# Patient Record
Sex: MALE | Race: WHITE | NOT HISPANIC OR LATINO | URBAN - NONMETROPOLITAN AREA
[De-identification: names, ages, dates, MRNs, and addresses within clinical notes are randomized per-mention and may not be internally consistent; named-entity substitution may affect disease eponyms.]

---

## 2024-05-02 ENCOUNTER — OFFICE (OUTPATIENT)
Dept: URBAN - NONMETROPOLITAN AREA CLINIC 1 | Facility: CLINIC | Age: 69
End: 2024-05-02
Payer: COMMERCIAL

## 2024-05-02 VITALS
WEIGHT: 168 LBS | HEIGHT: 69 IN | DIASTOLIC BLOOD PRESSURE: 71 MMHG | HEART RATE: 78 BPM | SYSTOLIC BLOOD PRESSURE: 128 MMHG

## 2024-05-02 DIAGNOSIS — R63.4 ABNORMAL WEIGHT LOSS: ICD-10-CM

## 2024-05-02 DIAGNOSIS — G20.A1 PARKINSON'S DISEASE WITHOUT DYSKINESIA, WITHOUT MENTION OF F: ICD-10-CM

## 2024-05-02 PROCEDURE — 99204 OFFICE O/P NEW MOD 45 MIN: CPT | Performed by: NURSE PRACTITIONER

## 2024-05-02 NOTE — SERVICENOTES
Risks of procedure explained to patient
Bowel prep will be prescribed and instructions will be given to patient if he elects to schedule colonoscopy.
Patient wishes to discuss anesthesia concerns with his neurologist and would like for me to review recent lab work to determine best plan for patient considering he has gained back his weight, feeling great, and overall asymptomatic.  He is due screening for crc and would rather perform Cologuard as least invasive and avoid anesthesia, however, if labs reveal ZOHRA or abnormality, he will undergo scopes. EGD would be for completeness in setting of unintentional weight loss. 
Needs to discuss anesthesia with neurology 
Request recent labs and OV note from PCP and will review.
More to follow.

## 2024-05-02 NOTE — SERVICEHPINOTES
Patient with a history of Parkinson's presents to the clinic today with his wife for colonoscopy consult. He was originally referred by his PCP in 12/2023 for unintentional weight loss weighing 160lbs at that OV.  He tells me lost 17lbs over 2-3 months without trying noted in during 12/2023-2/24.  He reports a very stressful job as a  and was dealing with a very stressful time with the [employer].  He states he has gained back the weight he lost.  He is at normal adult weight as he reports he stays around 168-170lbs.  Has a great appetite and has 1-2 BM daily, admtis straining with defecation as well.  Does not take anything for the constipation. Denies melena, n/v/d, abdominal pain, or fever.  Denies upper gi complaints to speak of or dysphagia.  Last colonoscopy was in 2007 by Dr. Naa bustillo prep, normal other than mild to mod diverticulosis left and right colon, small external hemorrhoids.  He has a family history of crc in both paternal and maternal grandfather.  Denies cardiac stents, anticoagulation therapy, ckd, supplemental oxygenation use.  He tells me he had a complete blood panel workup a few weeks ago by PCP-will request records. Pt is hesitant to have procedure due to concerns with anesthesia and Parkinson's-dementia.  He has gained back the weight he lost as his life is much less stressful, and he is otherwise feeling great, although he is overdue crc screening.    br
br Dr. Lopez [place], [place] [facility] 
br
Colonoscopy by Dr. Jacome 1/3/2007brPrep fair. The ileocecal valve, appendiceal orifice, and distal terminal ileum identified and inspected normal in appearance. Mild-to-moderate diverticulosis involving the left greater than right colon. Small external hemorrhoids. No polyps tumors or inflammatory changes encountered